# Patient Record
Sex: FEMALE | URBAN - METROPOLITAN AREA
[De-identification: names, ages, dates, MRNs, and addresses within clinical notes are randomized per-mention and may not be internally consistent; named-entity substitution may affect disease eponyms.]

---

## 2023-03-04 PROBLEM — F43.9 STRESS: Status: ACTIVE | Noted: 2023-03-04

## 2023-03-04 PROBLEM — F90.2 ADHD (ATTENTION DEFICIT HYPERACTIVITY DISORDER), COMBINED TYPE: Status: ACTIVE | Noted: 2023-03-04

## 2023-03-04 PROBLEM — R68.89 FLU-LIKE SYMPTOMS: Status: ACTIVE | Noted: 2023-03-04

## 2023-03-04 PROBLEM — J20.9 ACUTE BRONCHITIS: Status: ACTIVE | Noted: 2023-03-04

## 2023-03-04 PROBLEM — M54.50 LOW BACK PAIN: Status: ACTIVE | Noted: 2023-03-04
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
## 2023-03-04 PROBLEM — J02.9 ACUTE PHARYNGITIS: Status: ACTIVE | Noted: 2023-03-04

## 2023-03-04 PROBLEM — J01.90 ACUTE SINUSITIS: Status: ACTIVE | Noted: 2023-03-04

## 2023-03-04 RX ORDER — METHYLPHENIDATE HYDROCHLORIDE 54 MG/1
54 TABLET ORAL DAILY
COMMUNITY
Start: 2013-04-16 | End: 2023-07-18 | Stop reason: SDUPTHER

## 2023-03-08 ENCOUNTER — OFFICE VISIT (OUTPATIENT)
Dept: PRIMARY CARE | Facility: CLINIC | Age: 53
End: 2023-03-08
Payer: COMMERCIAL

## 2023-03-08 VITALS
BODY MASS INDEX: 33.61 KG/M2 | TEMPERATURE: 97.6 F | HEART RATE: 87 BPM | OXYGEN SATURATION: 95 % | SYSTOLIC BLOOD PRESSURE: 107 MMHG | WEIGHT: 241 LBS | RESPIRATION RATE: 16 BRPM | DIASTOLIC BLOOD PRESSURE: 73 MMHG

## 2023-03-08 DIAGNOSIS — E66.9 OBESITY (BMI 30.0-34.9): ICD-10-CM

## 2023-03-08 DIAGNOSIS — F90.9 ATTENTION DEFICIT HYPERACTIVITY DISORDER (ADHD), UNSPECIFIED ADHD TYPE: Primary | ICD-10-CM

## 2023-03-08 LAB
AMPHETAMINE (PRESENCE) IN URINE BY SCREEN METHOD: NORMAL
BARBITURATES PRESENCE IN URINE BY SCREEN METHOD: NORMAL
BENZODIAZEPINE (PRESENCE) IN URINE BY SCREEN METHOD: NORMAL
CANNABINOIDS IN URINE BY SCREEN METHOD: NORMAL
COCAINE (PRESENCE) IN URINE BY SCREEN METHOD: NORMAL
DRUG SCREEN COMMENT URINE: NORMAL
FENTANYL URINE: NORMAL
METHADONE (PRESENCE) IN URINE BY SCREEN METHOD: NORMAL
OPIATES (PRESENCE) IN URINE BY SCREEN METHOD: NORMAL
OXYCODONE (PRESENCE) IN URINE BY SCREEN METHOD: NORMAL
PHENCYCLIDINE (PRESENCE) IN URINE BY SCREEN METHOD: NORMAL

## 2023-03-08 PROCEDURE — 80324 DRUG SCREEN AMPHETAMINES 1/2: CPT

## 2023-03-08 PROCEDURE — 99214 OFFICE O/P EST MOD 30 MIN: CPT | Performed by: FAMILY MEDICINE

## 2023-03-08 PROCEDURE — 80307 DRUG TEST PRSMV CHEM ANLYZR: CPT

## 2023-03-08 RX ORDER — METHYLPHENIDATE HYDROCHLORIDE 54 MG/1
54 TABLET ORAL EVERY MORNING
Qty: 30 TABLET | Refills: 0 | Status: SHIPPED | OUTPATIENT
Start: 2023-04-07 | End: 2023-10-25 | Stop reason: SDUPTHER

## 2023-03-08 RX ORDER — METHYLPHENIDATE HYDROCHLORIDE 54 MG/1
54 TABLET ORAL EVERY MORNING
Qty: 30 TABLET | Refills: 0 | Status: SHIPPED | OUTPATIENT
Start: 2023-05-07 | End: 2023-08-24 | Stop reason: ALTCHOICE

## 2023-03-08 RX ORDER — METHYLPHENIDATE HYDROCHLORIDE 54 MG/1
54 TABLET ORAL EVERY MORNING
Qty: 30 TABLET | Refills: 0 | Status: SHIPPED | OUTPATIENT
Start: 2023-03-08 | End: 2023-08-24 | Stop reason: ALTCHOICE

## 2023-03-08 NOTE — PROGRESS NOTES
Subjective   Patient ID: Sera Mari is a 52 y.o. female who presents for Med Refill (No complaints).    HPI   Patient comes in today with her son Eugenio for checkup and refill of her medication.  She is taking her medicines as directed and is currently without complaints.  I have personally reviewed the OARRS report for patient today and I have considered the risk of abuse, dependence, addiction and diversion.   Review of Systems   All other systems reviewed and are negative.      Objective   /73   Pulse 87   Temp 36.4 °C (97.6 °F)   Resp 16   Wt 109 kg (241 lb)   SpO2 95%   BMI 33.61 kg/m²     Physical Exam  Vitals reviewed.   Constitutional:       Appearance: She is well-developed. She is obese.   HENT:      Head: Normocephalic and atraumatic.      Right Ear: Tympanic membrane, ear canal and external ear normal.      Left Ear: Tympanic membrane, ear canal and external ear normal.      Nose: Nose normal.      Mouth/Throat:      Mouth: Mucous membranes are moist.      Pharynx: Oropharynx is clear.   Eyes:      Extraocular Movements: Extraocular movements intact.      Conjunctiva/sclera: Conjunctivae normal.      Pupils: Pupils are equal, round, and reactive to light.   Cardiovascular:      Rate and Rhythm: Normal rate and regular rhythm.      Heart sounds: Normal heart sounds. No murmur heard.  Pulmonary:      Effort: Pulmonary effort is normal.      Breath sounds: Normal breath sounds. No wheezing.   Abdominal:      General: Abdomen is flat. Bowel sounds are normal.      Palpations: Abdomen is soft.   Musculoskeletal:         General: Normal range of motion.   Skin:     General: Skin is warm and dry.   Neurological:      General: No focal deficit present.      Mental Status: She is alert and oriented to person, place, and time. Mental status is at baseline.   Psychiatric:         Mood and Affect: Mood normal.         Behavior: Behavior normal.         Thought Content: Thought content normal.          Judgment: Judgment normal.         Assessment/Plan   Problem List Items Addressed This Visit    None  Visit Diagnoses       Attention deficit hyperactivity disorder (ADHD), unspecified ADHD type    -  Primary    Relevant Medications    methylphenidate (Concerta) 54 mg ER tablet    methylphenidate (Concerta) 54 mg ER tablet (Start on 5/7/2023)    methylphenidate (Concerta) 54 mg ER tablet (Start on 4/7/2023)    Other Relevant Orders    Drug Screen, Urine With Reflex to Confirmation (Completed)        News RUDS and CSA today.  Continue current meds as directed.  Follow up in 3 months for recheck if all remains stable, sooner if problems arise.  Medication list reconciled.  Thank you for visiting today!      Professional services: Some of this note was completed using Dragon voice technology and sometimes the software misinterprets words. This may include unintended errors with respect to translation of words, typographical errors or grammar errors which may not have been identified prior to finalization of the chart note. Please take this into account when reading the note. Thank you.

## 2023-03-10 PROBLEM — F90.9 ATTENTION DEFICIT HYPERACTIVITY DISORDER (ADHD): Status: ACTIVE | Noted: 2023-03-04

## 2023-03-10 PROBLEM — E66.9 OBESITY (BMI 30.0-34.9): Status: ACTIVE | Noted: 2023-03-10

## 2023-03-13 LAB
AMPHETAMINES,URINE: <50 NG/ML
MDA,URINE: <200 NG/ML
MDEA,URINE: <200 NG/ML
MDMA,UR: <200 NG/ML
METHAMPHETAMINE QUANTITATIVE URINE: <200 NG/ML
PHENTERMINE,UR: <200 NG/ML

## 2023-04-27 ENCOUNTER — TELEPHONE (OUTPATIENT)
Dept: PRIMARY CARE | Facility: CLINIC | Age: 53
End: 2023-04-27
Payer: COMMERCIAL

## 2023-04-27 NOTE — TELEPHONE ENCOUNTER
Patient is requesting a refill on her     Concerta 54 mg    Sent to [facility]    Thank You

## 2023-07-18 DIAGNOSIS — F90.2 ATTENTION DEFICIT HYPERACTIVITY DISORDER (ADHD), COMBINED TYPE: Primary | ICD-10-CM

## 2023-07-18 RX ORDER — METHYLPHENIDATE HYDROCHLORIDE 54 MG/1
54 TABLET ORAL DAILY
Qty: 30 TABLET | Refills: 0 | Status: SHIPPED | OUTPATIENT
Start: 2023-07-18 | End: 2023-08-24 | Stop reason: ALTCHOICE

## 2023-07-18 NOTE — TELEPHONE ENCOUNTER
Requested Prescriptions     Pending Prescriptions Disp Refills    methylphenidate (Concerta) 54 mg ER tablet 30 tablet 0     Sig: Take 1 tablet (54 mg) by mouth once daily.

## 2023-07-18 NOTE — TELEPHONE ENCOUNTER
Patient is requesting a refill on her     Concerta 54 mg    Sent to [facility] in [place]    Patient states that she is completley out of the medication.    Thank You

## 2023-07-20 ENCOUNTER — OFFICE VISIT (OUTPATIENT)
Dept: PRIMARY CARE | Facility: CLINIC | Age: 53
End: 2023-07-20
Payer: COMMERCIAL

## 2023-07-20 VITALS
SYSTOLIC BLOOD PRESSURE: 168 MMHG | WEIGHT: 244 LBS | DIASTOLIC BLOOD PRESSURE: 103 MMHG | RESPIRATION RATE: 20 BRPM | OXYGEN SATURATION: 95 % | TEMPERATURE: 97.8 F | HEART RATE: 90 BPM | BODY MASS INDEX: 34.03 KG/M2

## 2023-07-20 DIAGNOSIS — E78.5 HYPERLIPIDEMIA, UNSPECIFIED HYPERLIPIDEMIA TYPE: ICD-10-CM

## 2023-07-20 DIAGNOSIS — E53.8 VITAMIN B12 DEFICIENCY: ICD-10-CM

## 2023-07-20 DIAGNOSIS — N95.1 POST MENOPAUSAL SYNDROME: ICD-10-CM

## 2023-07-20 DIAGNOSIS — Z83.49 FAMILY HISTORY OF THYROID DISEASE: Primary | ICD-10-CM

## 2023-07-20 DIAGNOSIS — E55.9 VITAMIN D DEFICIENCY: ICD-10-CM

## 2023-07-20 DIAGNOSIS — F90.2 ATTENTION DEFICIT HYPERACTIVITY DISORDER (ADHD), COMBINED TYPE: ICD-10-CM

## 2023-07-20 PROCEDURE — 99214 OFFICE O/P EST MOD 30 MIN: CPT | Performed by: FAMILY MEDICINE

## 2023-07-20 NOTE — PROGRESS NOTES
Subjective   Patient ID: Sera Mari is a 53 y.o. female who presents for Med Refill.    OARRS:  Lamin Ron DO on 7/23/2023  8:17 AM  I have personally reviewed the OARRS report for Sera Mari. I have considered the risks of abuse, dependence, addiction and diversion    Is the patient prescribed a combination of a benzodiazepine and opioid?  No    Last Urine Drug Screen / ordered today: No  Recent Results (from the past 97264 hour(s))   Amphetamine Confirm, Urine    Collection Time: 03/08/23  2:59 PM   Result Value Ref Range    Amphetamines,Urine <50 ng/mL    MDA, Urine <200 ng/mL    MDEA, Urine <200 ng/mL    MDMA, Urine <200 ng/mL    Methamphetamine Quant, Ur <200 ng/mL    Phentermine,Urine <200 ng/mL   Drug Screen, Urine With Reflex to Confirmation    Collection Time: 03/08/23  2:59 PM   Result Value Ref Range    DRUG SCREEN COMMENT URINE SEE BELOW     Amphetamine Screen, Urine PRESUMPTIVE NEGATIVE NEGATIVE    Barbiturate Screen, Urine PRESUMPTIVE NEGATIVE NEGATIVE    BENZODIAZEPINE (PRESENCE) IN URINE BY SCREEN METHOD PRESUMPTIVE NEGATIVE NEGATIVE    Cannabinoid Screen, Urine PRESUMPTIVE NEGATIVE NEGATIVE    Cocaine Screen, Urine PRESUMPTIVE NEGATIVE NEGATIVE    Fentanyl, Ur PRESUMPTIVE NEGATIVE NEGATIVE    Methadone Screen, Urine PRESUMPTIVE NEGATIVE NEGATIVE    Opiate Screen, Urine PRESUMPTIVE NEGATIVE NEGATIVE    Oxycodone Screen, Ur PRESUMPTIVE NEGATIVE NEGATIVE    PCP Screen, Urine PRESUMPTIVE NEGATIVE NEGATIVE     Results are as expected.     Controlled Substance Agreement:  Date of the Last Agreement: 3/8/2023  Reviewed Controlled Substance Agreement including but not limited to the benefits, risks, and alternatives to treatment with a Controlled Substance medication(s).    Stimulants:   What is the patient's goal of therapy?  To help her stay focused at work  Is this being achieved with current treatment?  Yes    Activities of Daily Living:   Is your overall impression that this patient is  benefiting (symptom reduction outweighs side effects) from stimulant therapy? Yes     1. Physical Functioning: Better  2. Family Relationship: Better  3. Social Relationship: Better  4. Mood: Better  5. Sleep Patterns: Better  6. Overall Function: Better      HPI   Patient presents today for 3-month checkup and refill of meds. She currently is without complaints. She states that she is taking her medicines as directed and is not having any side effects from them.      Patient also comes in today because she is experiencing hot flashes and night sweats.  She has not had a period in about 5 years.  She has also noticed an increase in her blood pressure.  She is not sure what may be happening.  She would like some help with her symptoms.    3/8/2023  Patient comes in today with her son Eugenio for checkup and refill of her medication.  She is taking her medicines as directed and is currently without complaints.  I have personally reviewed the OARRS report for patient today and I have considered the risk of abuse, dependence, addiction and diversion.         12/6/2022  Patient comes in today for evaluation of flulike symptoms since before Thanksgiving. She complains of a cough, sinus headache and sinus drainage. She has 3 sons and her  at home with her and no one else is sick. Her rapid flu test today is negative.    7/13/2022  Patient presents today for 3-month checkup and refill of meds. She currently is without complaints. She states that she is taking her medicines as directed and is not having any side effects from them. Her workplace has now relocated her back here from [facility]. She is no longer shuttling back-and-forth which she is happy about. The medicine continues to work well for her.    3/9/2022  Patient presents today for 3-month checkup and refill of meds. She currently is without complaints. She states that she is taking her medicines as directed and is not having any side effects from  them.    9/24/2021  Patient comes in today with her son Eugenio for follow-up on her medications. She currently is without complaints. She is taking her medicines as directed and is not having any side effects from them. 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 She continues have all the home stress as well.    4/21/2021  Patient presents today for checkup and refill of meds. She currently is without complaints. She states that she is taking her medicines as directed and is not having any side effects from them. She is under a great deal of stress. She continues to work at [employer] in [place] but is traveling back and forth by car now and is putting about 3000 miles a month on her car because the flights do not match with her schedule. She has 3 teenage sons 2 of whom are about to graduate. One of them is special needs. Her  is working nights as a  and is struggling with morbid obesity and diabetes and a bad hip and a bad knee. She is concerned that if he is forced to retire is going to have a significant impact on their family health insurance and health benefits.    1/12/2021  Patient is seen today as a telemedicine visit rendered via realtime interactive audio/video [email or web address] services as we are currently in the middle of a coronavirus pandemic worldwide. The patient was informed about the telehealth clinical encounter including benefits to avoiding travel and limitations to the assessment. In person care may be recommended if needed. Telehealth sessions are not being recorded and personal health information is protected.     Patient presents today for checkup and refill of meds. She currently is without complaints. She states that she is taking her medicines as  directed and is not having any side effects from them. She continues to work at [employer] in [place] [facility] [place] which is where the visit arises from today. She would like to continue the current dose of medication as it is working well. Her  has been promoted to Harry in the local [employer] force which she is happy about. She will be getting his promotion this Thursday in an official ceremony downtown.    9/28/2020  Patient is seen today as a telemedicine visit rendered via realtime interactive audio/video [email or web address] services as we are currently in the middle of a coronavirus pandemic worldwide. The patient was informed about the telehealth clinical encounter including benefits to avoiding travel and limitations to the assessment. In person care may be recommended if needed. Telehealth sessions are not being recorded and personal health information is protected.     Patient presents today for checkup and refill of meds. She currently is without complaints. She states that she is taking her medicines as directed and is not having any side effects from them. She continues to work at [employer] in [place] but her hours have been cut down to part-time due to the coronavirus.    4/24/2020  Patient is seen today as a telemedicine visit rendered via realtime interactive audio/video [email or web address] services as we are currently in the middle of a coronavirus pandemic worldwide with stay-at-home orders by the government. Patient presents today for checkup and refill of meds. She currently is without complaints. She states that she is taking her medicines as directed and is not having any side effects from them. 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 She has 3 teenage sons all of whom are home during this coronavirus pandemic. She states that several of her coworkers tested positive  "and two have  from the virus. She is trying to keep from bringing the virus home to her children.    11/15/19  Patient comes in today for follow-up on her ADHD medication. She continues to work with [employer] and is based in [place]. She is home right now for the weekend. The medicine continues to work very well for her in her fast-paced, high stress job. She also has 3 teenage sons all on ADD medication. As noted below there is a lot of family stress along with a lot of health issues with several members of family.    2018   The patient is a 48 year old female who presents with a complaint of Veins. The patient complains of varicose veins It is getting worse. It is located in the following areas: lower extremities (both). There has been associated throbbing,  but not painful. Previous treatment includes surgery. Note for \"Veins\": patient comes in today for follow-up on varicosities in both lower extremities. They seem to be getting worse.  She got them initially during her pregnancies about 14 years ago.  She did see vascular surgery at that time and had some work done but now they are getting worse.  She stands on her feet all day long at her job with [employer] in [place]. the varicosities bulge through her clothing which is very noticeable to her.    patient's  is thinking about retiring as a [employer]  and if he does so the family may move permanently to the [place] area where the patient's work is based.    patient's son has had more epileptic seizures and is being followed at the [facility].  Most recently he had one on vacation while playing foosball with his brother.  She seems to think they occur more frequently when the patient is on a boat.     Review of Systems   All other systems reviewed and are negative.      Objective   BP (!) 168/103   Pulse 90   Temp 36.6 °C (97.8 °F)   Resp 20   Wt 111 kg (244 lb)   LMP  (LMP Unknown)   SpO2 " 95%   BMI 34.03 kg/m²     Physical Exam  Vitals reviewed.   Constitutional:       Appearance: She is well-developed.   HENT:      Head: Normocephalic and atraumatic.      Right Ear: Tympanic membrane, ear canal and external ear normal.      Left Ear: Tympanic membrane, ear canal and external ear normal.      Nose: Nose normal.      Mouth/Throat:      Mouth: Mucous membranes are moist.      Pharynx: Oropharynx is clear.   Eyes:      Extraocular Movements: Extraocular movements intact.      Conjunctiva/sclera: Conjunctivae normal.      Pupils: Pupils are equal, round, and reactive to light.   Cardiovascular:      Rate and Rhythm: Normal rate and regular rhythm.      Heart sounds: Normal heart sounds. No murmur heard.  Pulmonary:      Effort: Pulmonary effort is normal.      Breath sounds: Normal breath sounds. No wheezing.   Abdominal:      General: Abdomen is flat. Bowel sounds are normal.      Palpations: Abdomen is soft.   Musculoskeletal:         General: Normal range of motion.   Skin:     General: Skin is warm and dry.   Neurological:      General: No focal deficit present.      Mental Status: She is alert and oriented to person, place, and time. Mental status is at baseline.   Psychiatric:         Mood and Affect: Mood normal.         Behavior: Behavior normal.         Thought Content: Thought content normal.         Judgment: Judgment normal.         Assessment/Plan   Problem List Items Addressed This Visit       Attention deficit hyperactivity disorder (ADHD)    Family history of thyroid disease - Primary    Relevant Orders    TSH with reflex to Free T4 if abnormal    Post menopausal syndrome    Relevant Orders    Estradiol    FSH & LH    Progesterone    Comprehensive Metabolic Panel     Other Visit Diagnoses       Vitamin B12 deficiency        Relevant Orders    CBC    Vitamin B12    Vitamin D deficiency        Relevant Orders    Vitamin D, Total    Hyperlipidemia, unspecified hyperlipidemia type         Relevant Orders    Lipid Panel        Will contact patient with lab results when available.  Medication list reconciled.  Thank you for visiting today!      Professional services: Some of this note was completed using Dragon voice technology and sometimes the software misinterprets words. This may include unintended errors with respect to translation of words, typographical errors or grammar errors which may not have been identified prior to finalization of the chart note. Please take this into account when reading the note. Thank you.

## 2023-08-23 ENCOUNTER — LAB (OUTPATIENT)
Dept: LAB | Facility: LAB | Age: 53
End: 2023-08-23
Payer: COMMERCIAL

## 2023-08-23 DIAGNOSIS — Z83.49 FAMILY HISTORY OF THYROID DISEASE: ICD-10-CM

## 2023-08-23 DIAGNOSIS — E78.5 HYPERLIPIDEMIA, UNSPECIFIED HYPERLIPIDEMIA TYPE: ICD-10-CM

## 2023-08-23 DIAGNOSIS — E53.8 VITAMIN B12 DEFICIENCY: ICD-10-CM

## 2023-08-23 DIAGNOSIS — N95.1 POST MENOPAUSAL SYNDROME: ICD-10-CM

## 2023-08-23 DIAGNOSIS — E55.9 VITAMIN D DEFICIENCY: ICD-10-CM

## 2023-08-23 LAB
ALANINE AMINOTRANSFERASE (SGPT) (U/L) IN SER/PLAS: 20 U/L (ref 7–45)
ALBUMIN (G/DL) IN SER/PLAS: 4 G/DL (ref 3.4–5)
ALKALINE PHOSPHATASE (U/L) IN SER/PLAS: 107 U/L (ref 33–110)
ANION GAP IN SER/PLAS: 10 MMOL/L (ref 10–20)
ASPARTATE AMINOTRANSFERASE (SGOT) (U/L) IN SER/PLAS: 22 U/L (ref 9–39)
BILIRUBIN TOTAL (MG/DL) IN SER/PLAS: 0.5 MG/DL (ref 0–1.2)
CALCIDIOL (25 OH VITAMIN D3) (NG/ML) IN SER/PLAS: 32 NG/ML
CALCIUM (MG/DL) IN SER/PLAS: 9.7 MG/DL (ref 8.6–10.3)
CARBON DIOXIDE, TOTAL (MMOL/L) IN SER/PLAS: 29 MMOL/L (ref 21–32)
CHLORIDE (MMOL/L) IN SER/PLAS: 105 MMOL/L (ref 98–107)
CHOLESTEROL (MG/DL) IN SER/PLAS: 245 MG/DL (ref 0–199)
CHOLESTEROL IN HDL (MG/DL) IN SER/PLAS: 41.4 MG/DL
CHOLESTEROL/HDL RATIO: 5.9
COBALAMIN (VITAMIN B12) (PG/ML) IN SER/PLAS: 342 PG/ML (ref 211–911)
CREATININE (MG/DL) IN SER/PLAS: 0.92 MG/DL (ref 0.5–1.05)
ERYTHROCYTE DISTRIBUTION WIDTH (RATIO) BY AUTOMATED COUNT: 13.2 % (ref 11.5–14.5)
ERYTHROCYTE MEAN CORPUSCULAR HEMOGLOBIN CONCENTRATION (G/DL) BY AUTOMATED: 32.9 G/DL (ref 32–36)
ERYTHROCYTE MEAN CORPUSCULAR VOLUME (FL) BY AUTOMATED COUNT: 90 FL (ref 80–100)
ERYTHROCYTES (10*6/UL) IN BLOOD BY AUTOMATED COUNT: 4.97 X10E12/L (ref 4–5.2)
ESTRADIOL (PG/ML) IN SER/PLAS: <19 PG/ML
FOLLITROPIN (IU/L) IN SER/PLAS: 100.4 IU/L
GFR FEMALE: 75 ML/MIN/1.73M2
GLUCOSE (MG/DL) IN SER/PLAS: 108 MG/DL (ref 74–99)
HEMATOCRIT (%) IN BLOOD BY AUTOMATED COUNT: 44.7 % (ref 36–46)
HEMOGLOBIN (G/DL) IN BLOOD: 14.7 G/DL (ref 12–16)
LDL: 166 MG/DL (ref 0–99)
LEUKOCYTES (10*3/UL) IN BLOOD BY AUTOMATED COUNT: 6.5 X10E9/L (ref 4.4–11.3)
LUTEINIZING HORMONE (IU/ML) IN SER/PLAS: 36.4 IU/L
PLATELETS (10*3/UL) IN BLOOD AUTOMATED COUNT: 227 X10E9/L (ref 150–450)
POTASSIUM (MMOL/L) IN SER/PLAS: 4.4 MMOL/L (ref 3.5–5.3)
PROGESTERONE (NG/ML) IN SER/PLAS: 0.5 NG/ML
PROTEIN TOTAL: 7.1 G/DL (ref 6.4–8.2)
SODIUM (MMOL/L) IN SER/PLAS: 140 MMOL/L (ref 136–145)
THYROTROPIN (MIU/L) IN SER/PLAS BY DETECTION LIMIT <= 0.05 MIU/L: 5.46 MIU/L (ref 0.44–3.98)
THYROXINE (T4) FREE (NG/DL) IN SER/PLAS: 0.69 NG/DL (ref 0.61–1.12)
TRIGLYCERIDE (MG/DL) IN SER/PLAS: 186 MG/DL (ref 0–149)
UREA NITROGEN (MG/DL) IN SER/PLAS: 15 MG/DL (ref 6–23)
VLDL: 37 MG/DL (ref 0–40)

## 2023-08-23 PROCEDURE — 84144 ASSAY OF PROGESTERONE: CPT

## 2023-08-23 PROCEDURE — 36415 COLL VENOUS BLD VENIPUNCTURE: CPT

## 2023-08-23 PROCEDURE — 82670 ASSAY OF TOTAL ESTRADIOL: CPT

## 2023-08-23 PROCEDURE — 83001 ASSAY OF GONADOTROPIN (FSH): CPT

## 2023-08-23 PROCEDURE — 84439 ASSAY OF FREE THYROXINE: CPT

## 2023-08-23 PROCEDURE — 85027 COMPLETE CBC AUTOMATED: CPT

## 2023-08-23 PROCEDURE — 82306 VITAMIN D 25 HYDROXY: CPT

## 2023-08-23 PROCEDURE — 84443 ASSAY THYROID STIM HORMONE: CPT

## 2023-08-23 PROCEDURE — 82607 VITAMIN B-12: CPT

## 2023-08-23 PROCEDURE — 83002 ASSAY OF GONADOTROPIN (LH): CPT

## 2023-08-23 PROCEDURE — 80061 LIPID PANEL: CPT

## 2023-08-23 PROCEDURE — 80053 COMPREHEN METABOLIC PANEL: CPT

## 2023-08-24 DIAGNOSIS — E03.9 HYPOTHYROIDISM, UNSPECIFIED TYPE: Primary | ICD-10-CM

## 2023-08-24 RX ORDER — LEVOTHYROXINE SODIUM 25 UG/1
25 TABLET ORAL DAILY
Qty: 90 TABLET | Refills: 1 | Status: SHIPPED | OUTPATIENT
Start: 2023-08-24 | End: 2024-03-20

## 2023-08-25 ENCOUNTER — TELEPHONE (OUTPATIENT)
Dept: PRIMARY CARE | Facility: CLINIC | Age: 53
End: 2023-08-25
Payer: COMMERCIAL

## 2023-08-25 NOTE — TELEPHONE ENCOUNTER
DO Sidney Larios MA  Please notify patient of underactive thyroid.  Would recommend that she start thyroid replacement with levothyroxine 25 mcg daily and will recheck in November 2023.  Prescription was sent to her pharmacy.    Also high cholesterol/HDL ratio 5.9!  Any ratio 5.0 or higher is considered high risk for heart disease and stroke, ratio of 3.4 is ideal. Would recommend closely watching cholesterol in the diet and will recheck in November 2023. If not improved at that time we may need to start medication.    Please let her know that her labs definitely indicate she is in menopause.    Her vitamin D level was low normal at 32.  It should be between 30 and 100 the closer to 50 the better. It is an important vitamin for your heart, lungs, immune system and bones among other things in your body. Would recommend over the counter vitamin D3 2000 units daily to get it into and keep it in the normal range and recheck in November 2023.    Her B12 was low normal at 342.  Vitamin B-12 level should be above 400. If less than 400 can have both neurologic and/or psychological symptoms.  Would recommend over-the-counter vitamin B-12 sublingual (under the tongue) 1000 units daily to keep the level above 400. Recheck in November 2023.    All the rest of her labs were good, recheck in 1 year.

## 2023-10-25 DIAGNOSIS — F90.9 ATTENTION DEFICIT HYPERACTIVITY DISORDER (ADHD), UNSPECIFIED ADHD TYPE: ICD-10-CM

## 2023-10-25 RX ORDER — METHYLPHENIDATE HYDROCHLORIDE 54 MG/1
54 TABLET ORAL EVERY MORNING
Qty: 30 TABLET | Refills: 0 | Status: SHIPPED | OUTPATIENT
Start: 2023-10-25 | End: 2023-10-26 | Stop reason: SDUPTHER

## 2023-10-25 NOTE — TELEPHONE ENCOUNTER
Patient requests prescription below    Last Office Visit: 7/20/2023     Requested Prescriptions     Pending Prescriptions Disp Refills    methylphenidate (Concerta) 54 mg ER tablet 30 tablet 0     Sig: Take 1 tablet (54 mg) by mouth once daily in the morning. Do not crush, chew, or split.

## 2023-10-26 RX ORDER — METHYLPHENIDATE HYDROCHLORIDE 54 MG/1
54 TABLET ORAL EVERY MORNING
Qty: 30 TABLET | Refills: 0 | Status: SHIPPED | OUTPATIENT
Start: 2023-10-26 | End: 2024-02-26 | Stop reason: SDUPTHER

## 2023-10-26 NOTE — TELEPHONE ENCOUNTER
Requested Prescriptions     Pending Prescriptions Disp Refills    methylphenidate (Concerta) 54 mg ER tablet 30 tablet 0     Sig: Take 1 tablet (54 mg) by mouth once daily in the morning. Do not crush, chew, or split.

## 2024-02-22 DIAGNOSIS — F90.9 ATTENTION DEFICIT HYPERACTIVITY DISORDER (ADHD), UNSPECIFIED ADHD TYPE: ICD-10-CM

## 2024-02-22 NOTE — TELEPHONE ENCOUNTER
Pt has not obdulia seen since July. You have to be seen every 3 months to get this medication. Please assist in scheduling thank you.

## 2024-02-26 ENCOUNTER — OFFICE VISIT (OUTPATIENT)
Dept: PRIMARY CARE | Facility: CLINIC | Age: 54
End: 2024-02-26
Payer: COMMERCIAL

## 2024-02-26 ENCOUNTER — LAB (OUTPATIENT)
Dept: LAB | Facility: LAB | Age: 54
End: 2024-02-26
Payer: COMMERCIAL

## 2024-02-26 VITALS
DIASTOLIC BLOOD PRESSURE: 77 MMHG | OXYGEN SATURATION: 93 % | TEMPERATURE: 97.3 F | SYSTOLIC BLOOD PRESSURE: 111 MMHG | RESPIRATION RATE: 24 BRPM | BODY MASS INDEX: 34.31 KG/M2 | WEIGHT: 246 LBS | HEART RATE: 84 BPM

## 2024-02-26 DIAGNOSIS — E55.9 VITAMIN D DEFICIENCY: ICD-10-CM

## 2024-02-26 DIAGNOSIS — E03.9 HYPOTHYROIDISM, UNSPECIFIED TYPE: ICD-10-CM

## 2024-02-26 DIAGNOSIS — F90.9 ATTENTION DEFICIT HYPERACTIVITY DISORDER (ADHD), UNSPECIFIED ADHD TYPE: ICD-10-CM

## 2024-02-26 DIAGNOSIS — E53.8 VITAMIN B12 DEFICIENCY: ICD-10-CM

## 2024-02-26 DIAGNOSIS — E53.8 VITAMIN B12 DEFICIENCY: Primary | ICD-10-CM

## 2024-02-26 LAB
25(OH)D3 SERPL-MCNC: 36 NG/ML (ref 30–100)
TSH SERPL-ACNC: 2.14 MIU/L (ref 0.44–3.98)
VIT B12 SERPL-MCNC: 832 PG/ML (ref 211–911)

## 2024-02-26 PROCEDURE — 84443 ASSAY THYROID STIM HORMONE: CPT

## 2024-02-26 PROCEDURE — 82306 VITAMIN D 25 HYDROXY: CPT

## 2024-02-26 PROCEDURE — 82607 VITAMIN B-12: CPT

## 2024-02-26 PROCEDURE — 1036F TOBACCO NON-USER: CPT | Performed by: FAMILY MEDICINE

## 2024-02-26 PROCEDURE — 99214 OFFICE O/P EST MOD 30 MIN: CPT | Performed by: FAMILY MEDICINE

## 2024-02-26 PROCEDURE — 36415 COLL VENOUS BLD VENIPUNCTURE: CPT

## 2024-02-26 RX ORDER — METHYLPHENIDATE HYDROCHLORIDE 54 MG/1
54 TABLET ORAL EVERY MORNING
Qty: 30 TABLET | Refills: 0 | Status: SHIPPED | OUTPATIENT
Start: 2024-02-26 | End: 2024-02-29 | Stop reason: SDUPTHER

## 2024-02-26 NOTE — PROGRESS NOTES
Subjective   Patient ID: Sera Mari is a 53 y.o. female who presents for Follow-up (Med fu for concerta. ).    OARRS:  Lamin Ron DO on 2/26/2024  3:18 PM  I have personally reviewed the OARRS report for Sera Mari. I have considered the risks of abuse, dependence, addiction and diversion and I believe that it is clinically appropriate for Sera Mari to be prescribed this medication    Is the patient prescribed a combination of a benzodiazepine and opioid?  No    Last Urine Drug Screen / ordered today: Yes  Recent Results (from the past 8760 hour(s))   Amphetamine Confirm, Urine    Collection Time: 03/08/23  2:59 PM   Result Value Ref Range    Amphetamines,Urine <50 ng/mL    MDA, Urine <200 ng/mL    MDEA, Urine <200 ng/mL    MDMA, Urine <200 ng/mL    Methamphetamine Quant, Ur <200 ng/mL    Phentermine,Urine <200 ng/mL   Drug Screen, Urine With Reflex to Confirmation    Collection Time: 03/08/23  2:59 PM   Result Value Ref Range    DRUG SCREEN COMMENT URINE SEE BELOW     Amphetamine Screen, Urine PRESUMPTIVE NEGATIVE NEGATIVE    Barbiturate Screen, Urine PRESUMPTIVE NEGATIVE NEGATIVE    BENZODIAZEPINE (PRESENCE) IN URINE BY SCREEN METHOD PRESUMPTIVE NEGATIVE NEGATIVE    Cannabinoid Screen, Urine PRESUMPTIVE NEGATIVE NEGATIVE    Cocaine Screen, Urine PRESUMPTIVE NEGATIVE NEGATIVE    Fentanyl, Ur PRESUMPTIVE NEGATIVE NEGATIVE    Methadone Screen, Urine PRESUMPTIVE NEGATIVE NEGATIVE    Opiate Screen, Urine PRESUMPTIVE NEGATIVE NEGATIVE    Oxycodone Screen, Ur PRESUMPTIVE NEGATIVE NEGATIVE    PCP Screen, Urine PRESUMPTIVE NEGATIVE NEGATIVE     Results are as expected.         Controlled Substance Agreement:  Date of the Last Agreement: 2/26/2024  Reviewed Controlled Substance Agreement including but not limited to the benefits, risks, and alternatives to treatment with a Controlled Substance medication(s).    Stimulants:   What is the patient's goal of therapy?  To help her stay focused at work  Is this being  achieved with current treatment?  Yes    Activities of Daily Living:   Is your overall impression that this patient is benefiting (symptom reduction outweighs side effects) from stimulant therapy? Yes     1. Physical Functioning: Better  2. Family Relationship: Better  3. Social Relationship: Better  4. Mood: Better  5. Sleep Patterns: Better  6. Overall Function: Better      HPI   Patient presents today for 3-month checkup and refill of meds. She currently is without complaints. She states that she is taking her medicines as directed and is not having any side effects from them.    7/20/2023  Patient presents today for 3-month checkup and refill of meds. She currently is without complaints. She states that she is taking her medicines as directed and is not having any side effects from them.      Patient also comes in today because she is experiencing hot flashes and night sweats.  She has not had a period in about 5 years.  She has also noticed an increase in her blood pressure.  She is not sure what may be happening.  She would like some help with her symptoms.    3/8/2023  Patient comes in today with her son Eugenio for checkup and refill of her medication.  She is taking her medicines as directed and is currently without complaints.  I have personally reviewed the OARRS report for patient today and I have considered the risk of abuse, dependence, addiction and diversion.     Review of Systems   All other systems reviewed and are negative.      Objective   /77   Pulse 84   Temp 36.3 °C (97.3 °F)   Resp 24   Wt 112 kg (246 lb)   LMP  (LMP Unknown)   SpO2 93%   BMI 34.31 kg/m²     Physical Exam  Vitals reviewed.   Constitutional:       Appearance: She is well-developed. She is obese.   HENT:      Head: Normocephalic and atraumatic.      Right Ear: Tympanic membrane, ear canal and external ear normal.      Left Ear: Tympanic membrane, ear canal and external ear normal.      Nose: Nose normal.       Mouth/Throat:      Mouth: Mucous membranes are moist.      Pharynx: Oropharynx is clear.   Eyes:      Extraocular Movements: Extraocular movements intact.      Conjunctiva/sclera: Conjunctivae normal.      Pupils: Pupils are equal, round, and reactive to light.   Cardiovascular:      Rate and Rhythm: Normal rate and regular rhythm.      Heart sounds: Normal heart sounds. No murmur heard.  Pulmonary:      Effort: Pulmonary effort is normal.      Breath sounds: Normal breath sounds. No wheezing.   Abdominal:      General: Abdomen is flat. Bowel sounds are normal.      Palpations: Abdomen is soft.   Musculoskeletal:         General: Normal range of motion.   Skin:     General: Skin is warm and dry.   Neurological:      General: No focal deficit present.      Mental Status: She is alert and oriented to person, place, and time. Mental status is at baseline.   Psychiatric:         Mood and Affect: Mood normal.         Behavior: Behavior normal.         Thought Content: Thought content normal.         Judgment: Judgment normal.       Assessment/Plan   Problem List Items Addressed This Visit             ICD-10-CM    Attention deficit hyperactivity disorder (ADHD) F90.9    Relevant Medications    methylphenidate ER (Concerta) 54 mg ER tablet    Other Relevant Orders    Drug Screen, Urine With Reflex to Confirmation    Follow Up In [facility] - PCP - Established     Other Visit Diagnoses         Codes    Vitamin B12 deficiency    -  Primary E53.8    Relevant Orders    Vitamin B12 (Completed)    Vitamin D deficiency     E55.9    Relevant Orders    Vitamin D 25-Hydroxy,Total (for eval of Vitamin D levels) (Completed)    Hypothyroidism, unspecified type     E03.9    Relevant Orders    TSH with reflex to Free T4 if abnormal (Completed)        New CSA and RUDS today  Will contact patient with lab results when available.  Continue current meds as directed.  Follow up in 3 months for recheck if all remains stable, sooner  if problems arise.  Medication list reconciled.  Thank you for visiting today!      Professional services: Some of this note was completed using Dragon voice technology and sometimes the software misinterprets words. This may include unintended errors with respect to translation of words, typographical errors or grammar errors which may not have been identified prior to finalization of the chart note. Please take this into account when reading the note. Thank you.

## 2024-02-27 LAB
AMPHETAMINES UR QL SCN: NORMAL
BARBITURATES UR QL SCN: NORMAL
BENZODIAZ UR QL SCN: NORMAL
BZE UR QL SCN: NORMAL
CANNABINOIDS UR QL SCN: NORMAL
FENTANYL+NORFENTANYL UR QL SCN: NORMAL
OPIATES UR QL SCN: NORMAL
OXYCODONE+OXYMORPHONE UR QL SCN: NORMAL
PCP UR QL SCN: NORMAL

## 2024-02-27 PROCEDURE — 80307 DRUG TEST PRSMV CHEM ANLYZR: CPT

## 2024-02-29 DIAGNOSIS — F90.9 ATTENTION DEFICIT HYPERACTIVITY DISORDER (ADHD), UNSPECIFIED ADHD TYPE: ICD-10-CM

## 2024-02-29 RX ORDER — METHYLPHENIDATE HYDROCHLORIDE 54 MG/1
54 TABLET ORAL EVERY MORNING
Qty: 30 TABLET | Refills: 0 | OUTPATIENT
Start: 2024-02-29 | End: 2024-03-30

## 2024-02-29 RX ORDER — METHYLPHENIDATE HYDROCHLORIDE 54 MG/1
54 TABLET ORAL EVERY MORNING
Qty: 30 TABLET | Refills: 0 | Status: SHIPPED | OUTPATIENT
Start: 2024-02-29 | End: 2024-03-30

## 2024-03-01 ENCOUNTER — TELEPHONE (OUTPATIENT)
Dept: PRIMARY CARE | Facility: CLINIC | Age: 54
End: 2024-03-01
Payer: COMMERCIAL

## 2024-03-01 NOTE — TELEPHONE ENCOUNTER
----- Message from Lamin Ron DO sent at 2/28/2024  7:22 AM EST -----  Regarding: Labs  Please notify patient her vitamin D was low normal at 36.  It should be between 30 and 100 the closer to 50 the better. It is an important vitamin for your heart, lungs, immune system and bones among other things in your body. Would recommend over the counter vitamin D3 2000 units daily to get it into and keep it in the normal range and recheck in 3 months.      Her B12 level was excellent!  You can stop any B12 supplements for now.    Her thyroid level looks good.  Continue the current levothyroxine dose and recheck in 1 year.  ----- Message -----  From: Lab, Background User  Sent: 2/26/2024   9:24 PM EST  To: Lamin Ron DO

## 2024-03-19 DIAGNOSIS — E03.9 HYPOTHYROIDISM, UNSPECIFIED TYPE: ICD-10-CM

## 2024-03-20 RX ORDER — LEVOTHYROXINE SODIUM 25 UG/1
25 TABLET ORAL DAILY
Qty: 90 TABLET | Refills: 1 | Status: SHIPPED | OUTPATIENT
Start: 2024-03-20

## 2024-08-09 DIAGNOSIS — E03.9 HYPOTHYROIDISM, UNSPECIFIED TYPE: Primary | ICD-10-CM
